# Patient Record
Sex: FEMALE | Race: WHITE | Employment: FULL TIME | URBAN - METROPOLITAN AREA
[De-identification: names, ages, dates, MRNs, and addresses within clinical notes are randomized per-mention and may not be internally consistent; named-entity substitution may affect disease eponyms.]

---

## 2019-12-08 ENCOUNTER — APPOINTMENT (OUTPATIENT)
Dept: GENERAL RADIOLOGY | Age: 44
End: 2019-12-08
Attending: PHYSICIAN ASSISTANT
Payer: COMMERCIAL

## 2019-12-08 ENCOUNTER — HOSPITAL ENCOUNTER (EMERGENCY)
Age: 44
Discharge: HOME | End: 2019-12-08
Attending: EMERGENCY MEDICINE
Payer: COMMERCIAL

## 2019-12-08 VITALS
RESPIRATION RATE: 17 BRPM | WEIGHT: 175 LBS | DIASTOLIC BLOOD PRESSURE: 70 MMHG | OXYGEN SATURATION: 99 % | SYSTOLIC BLOOD PRESSURE: 155 MMHG | HEART RATE: 75 BPM | BODY MASS INDEX: 34.36 KG/M2 | TEMPERATURE: 98.1 F | HEIGHT: 60 IN

## 2019-12-08 DIAGNOSIS — S82.892A CLOSED FRACTURE DISLOCATION OF LEFT ANKLE, INITIAL ENCOUNTER: Primary | ICD-10-CM

## 2019-12-08 PROCEDURE — 74011250636 HC RX REV CODE- 250/636: Performed by: EMERGENCY MEDICINE

## 2019-12-08 PROCEDURE — 74011250637 HC RX REV CODE- 250/637: Performed by: EMERGENCY MEDICINE

## 2019-12-08 PROCEDURE — 73610 X-RAY EXAM OF ANKLE: CPT

## 2019-12-08 PROCEDURE — 74011250636 HC RX REV CODE- 250/636

## 2019-12-08 PROCEDURE — 73600 X-RAY EXAM OF ANKLE: CPT

## 2019-12-08 PROCEDURE — 96374 THER/PROPH/DIAG INJ IV PUSH: CPT

## 2019-12-08 PROCEDURE — 99285 EMERGENCY DEPT VISIT HI MDM: CPT

## 2019-12-08 PROCEDURE — 75810000301 HC ER LEVEL 1 CLOSED TREATMNT FRACTURE/DISLOCATION

## 2019-12-08 PROCEDURE — 99152 MOD SED SAME PHYS/QHP 5/>YRS: CPT

## 2019-12-08 RX ORDER — OXYCODONE AND ACETAMINOPHEN 5; 325 MG/1; MG/1
1 TABLET ORAL
Qty: 12 TAB | Refills: 0 | Status: SHIPPED | OUTPATIENT
Start: 2019-12-08 | End: 2019-12-13

## 2019-12-08 RX ORDER — FENTANYL CITRATE 50 UG/ML
INJECTION, SOLUTION INTRAMUSCULAR; INTRAVENOUS
Status: COMPLETED
Start: 2019-12-08 | End: 2019-12-08

## 2019-12-08 RX ORDER — PROPOFOL 10 MG/ML
40 INJECTION, EMULSION INTRAVENOUS
Status: COMPLETED | OUTPATIENT
Start: 2019-12-08 | End: 2019-12-08

## 2019-12-08 RX ORDER — PROPOFOL 10 MG/ML
INJECTION, EMULSION INTRAVENOUS
Status: COMPLETED
Start: 2019-12-08 | End: 2019-12-08

## 2019-12-08 RX ORDER — PROPOFOL 10 MG/ML
80 INJECTION, EMULSION INTRAVENOUS
Status: COMPLETED | OUTPATIENT
Start: 2019-12-08 | End: 2019-12-08

## 2019-12-08 RX ORDER — FENTANYL CITRATE 50 UG/ML
50 INJECTION, SOLUTION INTRAMUSCULAR; INTRAVENOUS ONCE
Status: COMPLETED | OUTPATIENT
Start: 2019-12-08 | End: 2019-12-08

## 2019-12-08 RX ORDER — OXYCODONE AND ACETAMINOPHEN 5; 325 MG/1; MG/1
2 TABLET ORAL
Status: COMPLETED | OUTPATIENT
Start: 2019-12-08 | End: 2019-12-08

## 2019-12-08 RX ORDER — NORETHINDRONE ACETATE AND ETHINYL ESTRADIOL 1MG-20(21)
KIT ORAL
COMMUNITY

## 2019-12-08 RX ORDER — PROPRANOLOL HYDROCHLORIDE 1 MG/ML
INJECTION INTRAVENOUS
Status: DISCONTINUED
Start: 2019-12-08 | End: 2019-12-08 | Stop reason: WASHOUT

## 2019-12-08 RX ADMIN — PROPOFOL 40 MG: 10 INJECTION, EMULSION INTRAVENOUS at 16:22

## 2019-12-08 RX ADMIN — PROPOFOL 80 MG: 10 INJECTION, EMULSION INTRAVENOUS at 16:17

## 2019-12-08 RX ADMIN — FENTANYL CITRATE 50 MCG: 50 INJECTION INTRAMUSCULAR; INTRAVENOUS at 16:28

## 2019-12-08 RX ADMIN — PROPOFOL 40 MG: 10 INJECTION, EMULSION INTRAVENOUS at 16:18

## 2019-12-08 RX ADMIN — FENTANYL CITRATE 50 MCG: 50 INJECTION, SOLUTION INTRAMUSCULAR; INTRAVENOUS at 16:28

## 2019-12-08 RX ADMIN — OXYCODONE HYDROCHLORIDE AND ACETAMINOPHEN 2 TABLET: 5; 325 TABLET ORAL at 17:37

## 2019-12-08 RX ADMIN — FENTANYL CITRATE 50 MCG: 50 INJECTION, SOLUTION INTRAMUSCULAR; INTRAVENOUS at 16:32

## 2019-12-08 NOTE — DISCHARGE INSTRUCTIONS
1. Splint and crutches with complete nonweightbearing. 2.  You have a fracture dislocation of the ankle. The dislocation has been reduced but surgery will be required for definitive management. 3.  Follow-up with orthopedic surgery tomorrow. 4.  Elevate the left ankle. 5.  Percocet for pain 1 or 2 every 4 hours as needed. 6.  Return for medical emergencies as needed. Patient Education        Broken Ankle: Care Instructions  Your Care Instructions    An ankle may break (fracture) during sports, a fall, or other accidents. Fractures can range from a small, hairline crack, to a bone or bones broken into two or more pieces. Your treatment depends on how bad the break is. Your doctor may have put your ankle in a splint or cast to allow it to heal or to keep it stable until you see another doctor. It may take weeks or months for your ankle to heal. You can help your ankle heal with some care at home. You heal best when you take good care of yourself. Eat a variety of healthy foods, and don't smoke. You may have had a sedative to help you relax. You may be unsteady after having sedation. It can take a few hours for the medicine's effects to wear off. Common side effects of sedation include nausea, vomiting, and feeling sleepy or tired. The doctor has checked you carefully, but problems can develop later. If you notice any problems or new symptoms,  get medical treatment right away. Follow-up care is a key part of your treatment and safety. Be sure to make and go to all appointments, and call your doctor if you are having problems. It's also a good idea to know your test results and keep a list of the medicines you take. How can you care for yourself at home? · If the doctor gave you a sedative:  ? For 24 hours, don't do anything that requires attention to detail, such as going to work, making important decisions, or signing any legal documents.  It takes time for the medicine's effects to completely wear off.  ? For your safety, do not drive or operate any machinery that could be dangerous. Wait until the medicine wears off and you can think clearly and react easily. · Put ice or a cold pack on your ankle for 10 to 20 minutes at a time. Try to do this every 1 to 2 hours for the next 3 days (when you are awake). Put a thin cloth between the ice and your cast or splint. Keep your cast or splint dry. · Follow the cast care instructions your doctor gives you. If you have a splint, do not take it off unless your doctor tells you to. · Be safe with medicines. Take pain medicines exactly as directed. ? If the doctor gave you a prescription medicine for pain, take it as prescribed. ? If you are not taking a prescription pain medicine, ask your doctor if you can take an over-the-counter medicine. · Prop up your leg on pillows in the first few days after the injury. Keep the ankle higher than the level of your heart. This will help reduce swelling. · Do not put weight on your ankle unless your doctor tells you to. Use crutches to walk. · Follow instructions for exercises to keep your leg strong. · Wiggle your toes often to reduce swelling and stiffness. When should you call for help? Call 911 anytime you think you may need emergency care. For example, call if:    · You have chest pain, are short of breath, or you cough up blood.     · You are very sleepy and you have trouble waking up.    Call your doctor now or seek immediate medical care if:    · You have new or worse nausea or vomiting.     · You have new or worse pain.     · Your foot is cool or pale or changes color.     · You have tingling, weakness, or numbness in your toes.     · Your cast or splint feels too tight.     · You have signs of a blood clot in your leg (called a deep vein thrombosis), such as:  ? Pain in your calf, back of the knee, thigh, or groin. ?  Redness or swelling in your leg.    Watch closely for changes in your health, and be sure to contact your doctor if:    · You have a problem with your splint or cast.     · You do not get better as expected. Where can you learn more? Go to http://andrae-larry.info/. Enter P763 in the search box to learn more about \"Broken Ankle: Care Instructions. \"  Current as of: June 26, 2019  Content Version: 12.2  © 3023-4157 [employer]. Care instructions adapted under license by [employer] (which disclaims liability or warranty for this information). If you have questions about a medical condition or this instruction, always ask your healthcare professional. [place] [address] any warranty or liability for your use of this information.

## 2019-12-08 NOTE — ED NOTES
Both written and verbal discharge instructions given to pt and spouse/ parents with verbalized understanding of home care and follow up. Prescription given. Pt escorted to vehicle via wheelchair.

## 2019-12-08 NOTE — ED PROVIDER NOTES
43-year-old female slipped on front porch just prior to presentation. Has obvious deformity about the left ankle with the foot everted outward. Unable to bear weight. No other injuries reported. Paramedics transported patient in position of comfort. Fentanyl 100 mcg administered in route. History reviewed. No pertinent past medical history. History reviewed. No pertinent surgical history. History reviewed. No pertinent family history. Social History     Socioeconomic History    Marital status:      Spouse name: Not on file    Number of children: Not on file    Years of education: Not on file    Highest education level: Not on file   Occupational History    Not on file   Social Needs    Financial resource strain: Not on file    Food insecurity:     Worry: Not on file     Inability: Not on file    Transportation needs:     Medical: Not on file     Non-medical: Not on file   Tobacco Use    Smoking status: Not on file   Substance and Sexual Activity    Alcohol use: Not on file    Drug use: Not on file    Sexual activity: Not on file   Lifestyle    Physical activity:     Days per week: Not on file     Minutes per session: Not on file    Stress: Not on file   Relationships    Social connections:     Talks on phone: Not on file     Gets together: Not on file     Attends Protestant service: Not on file     Active member of club or organization: Not on file     Attends meetings of clubs or organizations: Not on file     Relationship status: Not on file    Intimate partner violence:     Fear of current or ex partner: Not on file     Emotionally abused: Not on file     Physically abused: Not on file     Forced sexual activity: Not on file   Other Topics Concern    Not on file   Social History Narrative    Not on file         ALLERGIES: Patient has no known allergies. Review of Systems   Respiratory: Negative for shortness of breath.     Cardiovascular: Negative for chest pain.   Gastrointestinal: Negative for abdominal distention. Musculoskeletal: Negative for back pain and neck pain. Neurological: Negative for syncope. All other systems reviewed and are negative. Vitals:    12/08/19 1443   BP: 150/72   Pulse: 80   Resp: 18   Temp: 98.1 °F (36.7 °C)   SpO2: 100%   Weight: 79.4 kg (175 lb)   Height: 5' (1.524 m)            Physical Exam  Vitals signs and nursing note reviewed. Constitutional:       General: She is not in acute distress. Appearance: She is well-developed. HENT:      Head: Normocephalic and atraumatic. Eyes:      General: No scleral icterus. Cardiovascular:      Rate and Rhythm: Normal rate. Pulmonary:      Effort: Pulmonary effort is normal.   Musculoskeletal:      Comments: There is eversion deformity of the left foot and ankle. Dorsalis pedis pulses readily palpated. Distal light touch sensation is preserved. No tenderness about the knee. Patient is unable to move the ankle and there is moderate soft tissue swelling about the mortise and lateral aspect of the ankle. Skin:     General: Skin is warm and dry. Neurological:      Mental Status: She is alert and oriented to person, place, and time. MDM  Number of Diagnoses or Management Options  Closed fracture dislocation of left ankle, initial encounter:   Diagnosis management comments: Pression: Fracture dislocation of the left ankle. X-ray consistent with trimalleolar fracture with dislocation and mortise disruption. Case discussed with orthopedics,  Dr. Sepideh Farris, who requests that I attempt closed reduction and his group will see in follow-up for surgical repair. Decision made to proceed with propofol sedation and closed reduction. Consent obtained. Patient monitored throughout. (See below notes for sedation and reduction.)  Family requested consultation with a different orthopedic group.   Subsequently discussed case with Dr. Magalsi Galicia, who concurs with initial plan as outlined above. States that his group will be happy to follow in consultation for surgical management.                 Procedural Sedation  Date/Time: 12/8/2019 3:47 PM  Performed by: Frieda Will MD  Authorized by: Frieda Will MD     Consent:     Consent obtained:  Written    Consent given by:  Patient    Risks discussed:  Prolonged hypoxia resulting in organ damage and respiratory compromise necessitating ventilatory assistance and intubation  Indications:     Procedure performed:  Fracture reduction    Procedure necessitating sedation performed by:  Physician performing sedation    Intended level of sedation:  Moderate (conscious sedation)  Pre-sedation assessment:     NPO status caution: urgency dictates proceeding with non-ideal NPO status      ASA classification: class 1 - normal, healthy patient      Neck mobility: normal      Mouth opening:  3 or more finger widths    Mallampati score:  II - soft palate, uvula, fauces visible    Pre-sedation assessments completed and reviewed: airway patency, mental status and respiratory function    Immediate pre-procedure details:     Reassessment: Patient reassessed immediately prior to procedure      Reviewed: vital signs      Verified: bag valve mask available, emergency equipment available, intubation equipment available, IV patency confirmed and oxygen available    Procedure details (see MAR for exact dosages):     Preoxygenation:  Nasal cannula    Sedation:  Propofol    Intra-procedure monitoring:  Blood pressure monitoring, cardiac monitor, continuous pulse oximetry and frequent vital sign checks    Intra-procedure events: none      Total sedation time (minutes):  15  Post-procedure details:     Attendance: Constant attendance by certified staff until patient recovered      Recovery: Patient returned to pre-procedure baseline      Complications:  None    Post-sedation assessments completed and reviewed: airway patency, mental status and respiratory function Patient is stable for discharge or admission: yes      Patient tolerance: Tolerated well, no immediate complications  Comments:      Post sedation analgesia w/ Fentanyl 50 micrgrams x 2    Reduction of Joint  Date/Time: 12/8/2019 5:23 PM  Performed by: Josephine Guardado MD  Authorized by: Josephine Guardado MD     Consent:     Consent obtained:  Written    Consent given by:  Patient    Risks discussed:  Nerve damage, vascular damage and pain  Injury:     Injury location:  Ankle    Ankle injury location:  L ankle    Ankle fracture type: trimalleolar    Pre-procedure assessment:     Neurological function: normal      Distal perfusion: diminished      Range of motion: reduced    Sedation:     Sedation type: Moderate (conscious) sedation  Procedure details:     Manipulation performed: yes (closed reduction, traction countertraction)      Reduction successful: yes      X-ray confirmed reduction: yes      Immobilization:  Splint    Splint type:  Short leg and sugar tong    Supplies used:  Cotton padding and Ortho-Glass  Post-procedure assessment:     Neurological function: normal      Distal perfusion: normal      Range of motion: improved      Patient tolerance of procedure: Tolerated well, no immediate complications  Comments:      Splint placed per myself with technician assistance. Posterior splint with stirrup placed to the ankle. Neurovascular status preserved post placement. No immediate complications. Crutch training provided. Diagnosis:   1. Closed fracture dislocation of left ankle, initial encounter      1. Splint and crutches with complete nonweightbearing. 2.  You have a fracture dislocation of the ankle. The dislocation has been reduced but surgery will be required for definitive management. 3.  Follow-up with orthopedic surgery tomorrow. 4.  Elevate the left ankle. 5.  Percocet for pain 1 or 2 every 4 hours as needed. 6.  Return for medical emergencies as needed.       Disposition: home    Follow-up Information     Follow up With Specialties Details Why Contact Info    Naomi Harris MD Orthopedic Surgery Call in 1 day for follow up. [address] [id number] [place]            Patient's Medications   Start Taking    OXYCODONE-ACETAMINOPHEN (PERCOCET) 5-325 MG PER TABLET    Take 1 Tab by mouth every four (4) hours as needed for Pain for up to 5 days. Max Daily Amount: 6 Tabs. Continue Taking    NORETHINDRONE-ETHINYL ESTRADIOL (JUNEL FE 1/20, 28,) 1 MG-20 MCG (21)/75 MG (7) TAB    Take  by mouth.    These Medications have changed    No medications on file   Stop Taking    No medications on file

## 2019-12-08 NOTE — LETTER
[address] [place] [address] [facility] 
[address] [facility] [id number] [phone] Work/School Note Date: 12/8/2019 To Whom It May concern: 
 
Hallie Wesley was seen and treated today in the emergency room by the following provider(s): 
Attending Provider: Reji Alcala MD. Hallie Wesley may not return to work full duty until seen, treated and cleared as fit for duty by orthopedic surgeon. Sincerely, Missy Orellana RN

## 2019-12-08 NOTE — ED NOTES
Consent for procedure and moderate sedation signed by Pt spouse Luis Armando Crew and witnessed by myself.  Signed by Dr. Fernando Goodman

## 2019-12-08 NOTE — ED TRIAGE NOTES
Patient brought via EMS for left ankle injury. Patient fell down stairs at home.   Per EMS report, patient has deformity to ankle

## 2019-12-09 NOTE — ED NOTES
Left leg splint intact and elevated. Skin color of toes pink with brisk cap refill, sensation intact. Pt can move toes. Reports feeling comfortable in splint.